# Patient Record
Sex: FEMALE | Race: BLACK OR AFRICAN AMERICAN | Employment: STUDENT | URBAN - METROPOLITAN AREA
[De-identification: names, ages, dates, MRNs, and addresses within clinical notes are randomized per-mention and may not be internally consistent; named-entity substitution may affect disease eponyms.]

---

## 2018-08-24 ENCOUNTER — OFFICE VISIT (OUTPATIENT)
Dept: FAMILY MEDICINE CLINIC | Age: 18
End: 2018-08-24

## 2018-08-24 VITALS
BODY MASS INDEX: 24.49 KG/M2 | OXYGEN SATURATION: 98 % | DIASTOLIC BLOOD PRESSURE: 62 MMHG | HEIGHT: 65 IN | SYSTOLIC BLOOD PRESSURE: 110 MMHG | HEART RATE: 79 BPM | WEIGHT: 147 LBS | TEMPERATURE: 98.4 F

## 2018-08-24 DIAGNOSIS — J00 ACUTE NASOPHARYNGITIS (COMMON COLD): Primary | ICD-10-CM

## 2018-08-24 PROCEDURE — 1036F TOBACCO NON-USER: CPT | Performed by: NURSE PRACTITIONER

## 2018-08-24 PROCEDURE — 99203 OFFICE O/P NEW LOW 30 MIN: CPT | Performed by: NURSE PRACTITIONER

## 2018-08-24 PROCEDURE — G8427 DOCREV CUR MEDS BY ELIG CLIN: HCPCS | Performed by: NURSE PRACTITIONER

## 2018-08-24 PROCEDURE — G8420 CALC BMI NORM PARAMETERS: HCPCS | Performed by: NURSE PRACTITIONER

## 2018-08-24 RX ORDER — PSEUDOEPHEDRINE HCL 120 MG/1
120 TABLET, FILM COATED, EXTENDED RELEASE ORAL EVERY 12 HOURS
Refills: 1 | COMMUNITY
Start: 2018-08-24 | End: 2018-08-31

## 2018-08-24 RX ORDER — DIPHENHYDRAMINE HCL 25 MG
CAPSULE ORAL
COMMUNITY
Start: 2018-08-24

## 2018-08-24 RX ORDER — AMOXICILLIN AND CLAVULANATE POTASSIUM 875; 125 MG/1; MG/1
1 TABLET, FILM COATED ORAL EVERY 12 HOURS
Qty: 10 TABLET | Refills: 0 | Status: SHIPPED | OUTPATIENT
Start: 2018-08-24 | End: 2018-08-29

## 2018-08-24 ASSESSMENT — ENCOUNTER SYMPTOMS
EYES NEGATIVE: 1
TROUBLE SWALLOWING: 0
SORE THROAT: 1
DIARRHEA: 0
WHEEZING: 0
SINUS PAIN: 0
CHEST TIGHTNESS: 0
VOMITING: 0
NAUSEA: 0
SINUS PRESSURE: 0
RHINORRHEA: 1
ABDOMINAL PAIN: 0
COUGH: 0

## 2018-08-24 ASSESSMENT — PATIENT HEALTH QUESTIONNAIRE - PHQ9
SUM OF ALL RESPONSES TO PHQ9 QUESTIONS 1 & 2: 0
SUM OF ALL RESPONSES TO PHQ QUESTIONS 1-9: 0
SUM OF ALL RESPONSES TO PHQ QUESTIONS 1-9: 0
2. FEELING DOWN, DEPRESSED OR HOPELESS: 0
1. LITTLE INTEREST OR PLEASURE IN DOING THINGS: 0

## 2018-08-24 NOTE — PATIENT INSTRUCTIONS
Colds last on avg 7-10 days (sometimes up to 2wks) with peak in symptoms on days 3-5. Recommend symptomatic treamtent with rest, fluids, good handwashing. The following may also be helpful for symptom management:    >Cold-eeze (zinc gluconate lozenges)-> dissolve 1 lozenge in mouth every 2-4 hrs while awake which can be helpful in supporting your immune system in fighting cold. >Sudafed 12 hr for nasal congestion. This is behind the pharmacy counter and will require that you show your license to purchase. >Benadryl 25-50mg at bedtime for nasal drainage (and may help with sleep). >Over the counter pain relievers acetaminophen and/or ibuprofen as needed and do not exceed daily amount printed on label. >Saltwater gargles, soft foods, cool liquids, over the counter cepacol throat lozenges as needed for sore throat. >Delsym as needed for cough. Follow directions on label. >1-2 tsp of honey before bedtime as needed for cough. **Antibiotics are NOT helpful and can be harmful when used to treat colds due to the possibility of unwanted side effects and the development of resistant bacteria. If you have symptoms that last for another 3 days without improvement then can fill augmentin (antibiotic) and take for full course. Please take a refrigerated probiotic (lactobacillus, bifidophilus) while on antibiotic and for several weeks to months following. Return for follow-up if you develop a new fever or severe/worsening symptoms at any point.

## 2018-08-24 NOTE — PROGRESS NOTES
Josue Rascon is a 19 y.o. female. Chief Complaint   Patient presents with    URI     Cough, Sore Throat, Congestion, Mucus, x 2 wks        SUBJECTIVE:     HPI     Yunior Downs is a new pt who presents with upper resp symptoms x 2 wks. Reports intermittent pharyngitis, nasal congestion+drainage, and mild cough (says she hasn't coughed in the last few days). Symptoms started with sore throat and then transitioned to head and nasal congestion. Nasal drainage has been clear, some sneezing. Feels nasal drainage in the back of her throat. Reports subjective fevers/chills the last 1-2 days, no objective fever. She has been taking otc cough/cold medicine without relief. Denies hx of allergic rhinitis. Non-smoker. Says she is on thyroid replacement? not sure of dose. She appears to have Hashimoto's or developing Hashimoto's based on her recent labs w/ [facility] (normal TSH and fT4 with significantly ELEVATED antibodies to TPO and thyroglobulin). Has been on zoloft for the last few years for anxiety and depression. Denies other sig past medical history. Review of Systems   Constitutional: Positive for chills, fatigue and fever (subjective). Negative for activity change, appetite change and diaphoresis. HENT: Positive for congestion, postnasal drip, rhinorrhea, sneezing and sore throat. Negative for ear pain, sinus pain, sinus pressure and trouble swallowing. Eyes: Negative. Respiratory: Negative for cough, chest tightness and wheezing. Cardiovascular: Negative. Gastrointestinal: Negative for abdominal pain, diarrhea, nausea and vomiting. Musculoskeletal: Negative for arthralgias and myalgias. Skin: Negative for rash. Neurological: Negative.         OBJECTIVE:      /62   Pulse 79   Temp 98.4 °F (36.9 °C) (Temporal)   Ht 5' 5\" (1.651 m)   Wt 147 lb (66.7 kg)   LMP 08/01/2018 (Approximate)   SpO2 98%   BMI 24.46 kg/m²       Physical Exam   Constitutional: She is oriented to person, place, and time. She appears well-developed and well-nourished. Non-toxic appearance. She does not have a sickly appearance. No distress. HENT:   Head: Normocephalic and atraumatic. Right Ear: Tympanic membrane, external ear and ear canal normal.   Left Ear: Tympanic membrane, external ear and ear canal normal.   Nose: Mucosal edema (left turbinates) present. No rhinorrhea. Right sinus exhibits no maxillary sinus tenderness and no frontal sinus tenderness. Left sinus exhibits no maxillary sinus tenderness and no frontal sinus tenderness. Mouth/Throat: Uvula is midline, oropharynx is clear and moist and mucous membranes are normal. No oropharyngeal exudate, posterior oropharyngeal edema or posterior oropharyngeal erythema. Eyes: Conjunctivae are normal. Right eye exhibits no discharge. Left eye exhibits no discharge. Neck: Normal range of motion. Neck supple. Cardiovascular: Normal rate, regular rhythm and normal heart sounds. Pulmonary/Chest: Effort normal and breath sounds normal. No respiratory distress. She has no wheezes. She has no rales. She exhibits no tenderness. Musculoskeletal: Normal range of motion. Lymphadenopathy:     She has no cervical adenopathy. Neurological: She is alert and oriented to person, place, and time. Skin: No rash noted. She is not diaphoretic. Psychiatric: She has a normal mood and affect. Her behavior is normal.       ASSESSMENT/PLAN:     Diagnosis Orders   1. Acute nasopharyngitis (common cold)  pseudoephedrine (SUDAFED 12 HR) 120 MG extended release tablet    diphenhydrAMINE (BENADRYL) 25 MG capsule   1. H&P c/w cold. Discussed symptomatic tx with rest, fluids, sudafed, benadryl, otc pain relievers, zinc gluconate.  Given persistent symptoms we discussed that if she has no improvement in 3 days after the above interventions then can fill augmentin and take full 5 day course ->at that point concern for secondary bacterial infx and may benefit from antibiotic. Discussed starting on refrigerated probiotic and yogurt if she starts taking antibiotic. Advised to follow-up if she develops severe/worsening symptoms or should f/u if no improvement in symptoms in 5 days. Pt voices understanding and is agreeable to plan. Current Outpatient Prescriptions   Medication Sig Dispense Refill    pseudoephedrine (SUDAFED 12 HR) 120 MG extended release tablet Take 1 tablet by mouth every 12 hours for 7 days  1    amoxicillin-clavulanate (AUGMENTIN) 875-125 MG per tablet Take 1 tablet by mouth every 12 hours for 5 days 10 tablet 0    diphenhydrAMINE (BENADRYL) 25 MG capsule 1-2 cap at bedtime as needed for runny nose. May cause sedation.  sertraline (ZOLOFT) 50 MG tablet Take 50 mg by mouth daily       No current facility-administered medications for this visit. No orders of the defined types were placed in this encounter. Return if symptoms worsen or fail to improve. Patient Instructions   Colds last on avg 7-10 days (sometimes up to 2wks) with peak in symptoms on days 3-5. Recommend symptomatic treamtent with rest, fluids, good handwashing. The following may also be helpful for symptom management:    >Cold-eeze (zinc gluconate lozenges)-> dissolve 1 lozenge in mouth every 2-4 hrs while awake which can be helpful in supporting your immune system in fighting cold. >Sudafed 12 hr for nasal congestion. This is behind the pharmacy counter and will require that you show your license to purchase. >Benadryl 25-50mg at bedtime for nasal drainage (and may help with sleep). >Over the counter pain relievers acetaminophen and/or ibuprofen as needed and do not exceed daily amount printed on label. >Saltwater gargles, soft foods, cool liquids, over the counter cepacol throat lozenges as needed for sore throat. >Delsym as needed for cough. Follow directions on label. >1-2 tsp of honey before bedtime as needed for cough.       **Antibiotics are NOT

## 2022-01-26 ENCOUNTER — TELEPHONE (OUTPATIENT)
Dept: GASTROENTEROLOGY | Age: 22
End: 2022-01-26

## 2022-01-26 NOTE — TELEPHONE ENCOUNTER
Called pt. In regards to an external referral for nausea, vomiting, diarrhea and abd pain. LM for pt to call back and make an appt.

## 2022-02-08 ENCOUNTER — TELEPHONE (OUTPATIENT)
Dept: GASTROENTEROLOGY | Age: 22
End: 2022-02-08

## 2022-02-08 NOTE — TELEPHONE ENCOUNTER
Called pt. In regards to an external referral for diarrhea, abd pain, nausea and vomiting.  Made appt for pt to see sandy on 2/16/22 @3:30pm

## 2022-02-16 ENCOUNTER — OFFICE VISIT (OUTPATIENT)
Dept: GASTROENTEROLOGY | Age: 22
End: 2022-02-16
Payer: COMMERCIAL

## 2022-02-16 VITALS
DIASTOLIC BLOOD PRESSURE: 62 MMHG | SYSTOLIC BLOOD PRESSURE: 116 MMHG | OXYGEN SATURATION: 97 % | HEART RATE: 73 BPM | HEIGHT: 65 IN | BODY MASS INDEX: 28.32 KG/M2 | WEIGHT: 170 LBS | TEMPERATURE: 98 F

## 2022-02-16 DIAGNOSIS — R11.15 CYCLICAL VOMITING WITH NAUSEA: Primary | ICD-10-CM

## 2022-02-16 PROCEDURE — 99203 OFFICE O/P NEW LOW 30 MIN: CPT | Performed by: NURSE PRACTITIONER

## 2022-02-16 ASSESSMENT — ENCOUNTER SYMPTOMS
ABDOMINAL PAIN: 0
COLOR CHANGE: 0
WHEEZING: 0
SHORTNESS OF BREATH: 0
CONSTIPATION: 0
VOMITING: 0
BACK PAIN: 0
COUGH: 0
DIARRHEA: 0
EYE PAIN: 0
BLOOD IN STOOL: 0
PHOTOPHOBIA: 0
NAUSEA: 0

## 2022-02-16 NOTE — PROGRESS NOTES
Cyril Johnson 19 y.o. female was seen by ZAHEER Grace on 2/16/2022     Wt Readings from Last 3 Encounters:   02/16/22 170 lb (77.1 kg)   08/24/18 147 lb (66.7 kg)       HPI  Cyril Johnson is a pleasant 19 y.o.  female who presents today for cyclical episodes of abdominal pain, diarrhea, nausea and vomiting. She has a past medical history of anxiety, depression, and hyperthyroidism. She denies new medication or diet changes. She denies NSAID use or smoking cigarettes. Occasional social alcohol use. She has been smoking marijuana for twelve years. Her cyclical episodes started in July of 2021 and mentioned at that time had a relationship that ended. She denied correlation with menstrual cycle. She did correlate the episodes with stressful events and anxiety. Her episodes of abdominal pain, diarrhea, nausea and vomiting that lasted 24 hours started in July then occurred again in September, October and December. No further episodes since after Christmas 2021. Her appetite is fair without early satiety. No lactose or gluten intolerance. Her weight is stable. No nausea or vomiting. No abdominal pain. Intermittent bloating. No heartburn or acid reflux. No nocturnal awakenings with acid reflux. No dysphagia or pain with swallowing. No changes in her bowel pattern. Her typical bowel pattern is every other day with soft brown formed stools. No current diarrhea or constipation. No blood in her stools or melena. No family history of stomach or colon cancer. ROS  Review of Systems   Constitutional: Positive for appetite change. Negative for chills, diaphoresis, fatigue, fever and unexpected weight change. HENT: Negative for ear pain, hearing loss and tinnitus. Eyes: Positive for visual disturbance. Negative for photophobia and pain. Respiratory: Negative for cough, shortness of breath and wheezing.     Cardiovascular: Negative for chest pain, palpitations and leg swelling. Gastrointestinal: Negative for abdominal pain, blood in stool, constipation, diarrhea, nausea and vomiting. Endocrine: Negative for cold intolerance, heat intolerance and polydipsia. Genitourinary: Negative for dysuria, frequency and urgency. Musculoskeletal: Negative for back pain, myalgias and neck pain. Skin: Negative for color change, pallor and rash. Allergic/Immunologic: Negative for environmental allergies and food allergies. Neurological: Negative for dizziness, seizures, weakness and headaches. Hematological: Bruises/bleeds easily. Psychiatric/Behavioral: Positive for dysphoric mood and sleep disturbance. Negative for suicidal ideas. The patient is nervous/anxious. Allergies  No Known Allergies    Medications  Current Outpatient Medications   Medication Sig Dispense Refill    sertraline (ZOLOFT) 50 MG tablet Take 50 mg by mouth daily      diphenhydrAMINE (BENADRYL) 25 MG capsule 1-2 cap at bedtime as needed for runny nose. May cause sedation. No current facility-administered medications for this visit. Past medical history:   She has a past medical history of Anxiety, Depression, and Hyperthyroidism. Past surgical history:  She has no past surgical history on file. Social History:  She reports that she quit smoking about 5 years ago. Her smoking use included cigarettes. She started smoking about 5 years ago. She has never used smokeless tobacco. She reports that she does not drink alcohol and does not use drugs. Family history:  Her family history is not on file. Objective    Vitals:    02/16/22 1601   BP: 116/62   Pulse: 73   Temp: 98 °F (36.7 °C)   SpO2: 97%        Physical exam    Physical Exam  Vitals reviewed. Constitutional:       General: She is not in acute distress. Appearance: Normal appearance. She is well-developed. She is not ill-appearing, toxic-appearing or diaphoretic. HENT:      Head: Normocephalic and atraumatic. Nose: Nose normal.      Mouth/Throat:      Mouth: Mucous membranes are moist.   Eyes:      General:         Right eye: No discharge. Left eye: No discharge. Pupils: Pupils are equal, round, and reactive to light. Neck:      Trachea: No tracheal deviation. Cardiovascular:      Rate and Rhythm: Normal rate and regular rhythm. Pulses: Normal pulses. Heart sounds: Normal heart sounds. No murmur heard. No gallop. Pulmonary:      Effort: Pulmonary effort is normal. No respiratory distress. Breath sounds: Normal breath sounds. No stridor. No wheezing, rhonchi or rales. Abdominal:      General: Bowel sounds are normal. There is no distension. Palpations: Abdomen is soft. There is no mass. Tenderness: There is no abdominal tenderness. There is no guarding or rebound. Hernia: No hernia is present. Musculoskeletal:         General: Normal range of motion. Cervical back: Normal range of motion and neck supple. Skin:     General: Skin is warm and dry. Neurological:      Mental Status: She is alert and oriented to person, place, and time. Psychiatric:         Mood and Affect: Mood normal.         No visits with results within 2 Month(s) from this visit. Latest known visit with results is:   No results found for any previous visit. Assessment and Plan:  1. Cyclical episodes of abdominal pain, diarrhea, nausea and vomiting that lasted 24 hours started in July then occurred again in September, October and December. No further episodes since after Christmas 2021. Her symptoms most likely related to anxiety or stressful situations. No current symptoms and reports feeling good. Her Zoloft has been adjusted by her PCP. Recommend stress reduction and avoiding situations that worsen stress and anxiety. Discussed marijuana may increase risk for cannibanoid hyperemesis and she verbalized understanding. No red flags indicated for EGD or colonoscopy at this time. Discussed abdominal ultrasound and EGD will hold off at this time due to resolution of symptoms. 2.  The patient was encouraged to follow-up as needed. Total time:  30 minutes.

## 2022-02-21 ENCOUNTER — TELEPHONE (OUTPATIENT)
Dept: GASTROENTEROLOGY | Age: 22
End: 2022-02-21

## 2022-02-21 NOTE — TELEPHONE ENCOUNTER
Mother of patient called in to report she and daughter are currently at urgent care.   Maegan Fontanez recommended that they do the urgent care appt and call back to make a follow up appt in office

## 2022-02-28 ENCOUNTER — OFFICE VISIT (OUTPATIENT)
Dept: GASTROENTEROLOGY | Age: 22
End: 2022-02-28
Payer: COMMERCIAL

## 2022-02-28 VITALS
HEART RATE: 87 BPM | OXYGEN SATURATION: 97 % | WEIGHT: 167.8 LBS | TEMPERATURE: 97.5 F | DIASTOLIC BLOOD PRESSURE: 76 MMHG | BODY MASS INDEX: 27.96 KG/M2 | HEIGHT: 65 IN | SYSTOLIC BLOOD PRESSURE: 118 MMHG

## 2022-02-28 DIAGNOSIS — F41.9 ANXIETY: ICD-10-CM

## 2022-02-28 DIAGNOSIS — R11.15 CYCLICAL VOMITING WITH NAUSEA: Primary | ICD-10-CM

## 2022-02-28 PROCEDURE — 99213 OFFICE O/P EST LOW 20 MIN: CPT | Performed by: NURSE PRACTITIONER

## 2022-02-28 RX ORDER — ONDANSETRON 4 MG/1
TABLET, ORALLY DISINTEGRATING ORAL
COMMUNITY
Start: 2022-02-21

## 2022-02-28 RX ORDER — HYDROXYZINE 50 MG/1
TABLET, FILM COATED ORAL
COMMUNITY
Start: 2022-02-21

## 2022-04-13 ENCOUNTER — TELEMEDICINE (OUTPATIENT)
Dept: PSYCHOLOGY | Age: 22
End: 2022-04-13
Payer: COMMERCIAL

## 2022-04-13 DIAGNOSIS — F41.9 ANXIETY: Primary | ICD-10-CM

## 2022-04-13 DIAGNOSIS — F32.A DEPRESSIVE DISORDER: ICD-10-CM

## 2022-04-13 PROCEDURE — 90791 PSYCH DIAGNOSTIC EVALUATION: CPT | Performed by: PSYCHOLOGIST
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
## 2022-06-08 ENCOUNTER — TELEMEDICINE (OUTPATIENT)
Dept: PSYCHOLOGY | Age: 22
End: 2022-06-08

## 2022-06-08 DIAGNOSIS — F41.9 ANXIETY: Primary | ICD-10-CM

## 2022-06-08 PROCEDURE — 99999 PR OFFICE/OUTPT VISIT,PROCEDURE ONLY: CPT | Performed by: PSYCHOLOGIST

## 2022-06-17 ENCOUNTER — HOSPITAL ENCOUNTER (OUTPATIENT)
Age: 22
Setting detail: SPECIMEN
Discharge: HOME | End: 2022-06-17
Payer: COMMERCIAL

## 2022-06-17 ENCOUNTER — OFFICE VISIT (OUTPATIENT)
Dept: OBGYN | Age: 22
End: 2022-06-17
Payer: COMMERCIAL

## 2022-06-17 VITALS — BODY MASS INDEX: 29.12 KG/M2 | DIASTOLIC BLOOD PRESSURE: 71 MMHG | WEIGHT: 175 LBS | SYSTOLIC BLOOD PRESSURE: 115 MMHG

## 2022-06-17 DIAGNOSIS — N94.6 DYSMENORRHEA: ICD-10-CM

## 2022-06-17 DIAGNOSIS — Z01.419 WOMEN'S ANNUAL ROUTINE GYNECOLOGICAL EXAMINATION: Primary | ICD-10-CM

## 2022-06-17 DIAGNOSIS — N89.8 VAGINAL DISCHARGE: ICD-10-CM

## 2022-06-17 DIAGNOSIS — Z30.09 GENERAL COUNSELING AND ADVICE ON CONTRACEPTIVE MANAGEMENT: ICD-10-CM

## 2022-06-17 DIAGNOSIS — N92.0 MENORRHAGIA WITH REGULAR CYCLE: ICD-10-CM

## 2022-06-17 DIAGNOSIS — Z11.3 SCREENING EXAMINATION FOR STD (SEXUALLY TRANSMITTED DISEASE): ICD-10-CM

## 2022-06-17 PROCEDURE — 88142 CYTOPATH C/V THIN LAYER: CPT

## 2022-06-17 PROCEDURE — 87801 DETECT AGNT MULT DNA AMPLI: CPT

## 2022-06-17 PROCEDURE — 99395 PREV VISIT EST AGE 18-39: CPT

## 2022-06-17 RX ORDER — DULOXETIN HYDROCHLORIDE 20 MG/1
20 CAPSULE, DELAYED RELEASE ORAL DAILY
COMMUNITY

## 2022-06-17 SDOH — ECONOMIC STABILITY: FOOD INSECURITY: WITHIN THE PAST 12 MONTHS, THE FOOD YOU BOUGHT JUST DIDN'T LAST AND YOU DIDN'T HAVE MONEY TO GET MORE.: NEVER TRUE

## 2022-06-17 SDOH — ECONOMIC STABILITY: FOOD INSECURITY: WITHIN THE PAST 12 MONTHS, YOU WORRIED THAT YOUR FOOD WOULD RUN OUT BEFORE YOU GOT MONEY TO BUY MORE.: NEVER TRUE

## 2022-06-17 SDOH — ECONOMIC STABILITY: INCOME INSECURITY: IN THE LAST 12 MONTHS, WAS THERE A TIME WHEN YOU WERE NOT ABLE TO PAY THE MORTGAGE OR RENT ON TIME?: NO

## 2022-06-17 SDOH — ECONOMIC STABILITY: TRANSPORTATION INSECURITY
IN THE PAST 12 MONTHS, HAS THE LACK OF TRANSPORTATION KEPT YOU FROM MEDICAL APPOINTMENTS OR FROM GETTING MEDICATIONS?: NO

## 2022-06-17 SDOH — ECONOMIC STABILITY: TRANSPORTATION INSECURITY
IN THE PAST 12 MONTHS, HAS LACK OF TRANSPORTATION KEPT YOU FROM MEETINGS, WORK, OR FROM GETTING THINGS NEEDED FOR DAILY LIVING?: NO

## 2022-06-17 SDOH — ECONOMIC STABILITY: HOUSING INSECURITY
IN THE LAST 12 MONTHS, WAS THERE A TIME WHEN YOU DID NOT HAVE A STEADY PLACE TO SLEEP OR SLEPT IN A SHELTER (INCLUDING NOW)?: NO

## 2022-06-17 ASSESSMENT — PATIENT HEALTH QUESTIONNAIRE - PHQ9
SUM OF ALL RESPONSES TO PHQ9 QUESTIONS 1 & 2: 0
7. TROUBLE CONCENTRATING ON THINGS, SUCH AS READING THE NEWSPAPER OR WATCHING TELEVISION: 0
SUM OF ALL RESPONSES TO PHQ QUESTIONS 1-9: 0
1. LITTLE INTEREST OR PLEASURE IN DOING THINGS: 0
9. THOUGHTS THAT YOU WOULD BE BETTER OFF DEAD, OR OF HURTING YOURSELF: 0
5. POOR APPETITE OR OVEREATING: 0
SUM OF ALL RESPONSES TO PHQ QUESTIONS 1-9: 0
4. FEELING TIRED OR HAVING LITTLE ENERGY: 0
SUM OF ALL RESPONSES TO PHQ QUESTIONS 1-9: 0
2. FEELING DOWN, DEPRESSED OR HOPELESS: 0
10. IF YOU CHECKED OFF ANY PROBLEMS, HOW DIFFICULT HAVE THESE PROBLEMS MADE IT FOR YOU TO DO YOUR WORK, TAKE CARE OF THINGS AT HOME, OR GET ALONG WITH OTHER PEOPLE: 0
SUM OF ALL RESPONSES TO PHQ QUESTIONS 1-9: 0
6. FEELING BAD ABOUT YOURSELF - OR THAT YOU ARE A FAILURE OR HAVE LET YOURSELF OR YOUR FAMILY DOWN: 0
3. TROUBLE FALLING OR STAYING ASLEEP: 0
8. MOVING OR SPEAKING SO SLOWLY THAT OTHER PEOPLE COULD HAVE NOTICED. OR THE OPPOSITE, BEING SO FIGETY OR RESTLESS THAT YOU HAVE BEEN MOVING AROUND A LOT MORE THAN USUAL: 0

## 2022-06-17 ASSESSMENT — ENCOUNTER SYMPTOMS
RESPIRATORY NEGATIVE: 1
GASTROINTESTINAL NEGATIVE: 1
ABDOMINAL PAIN: 0

## 2022-06-17 ASSESSMENT — SOCIAL DETERMINANTS OF HEALTH (SDOH): HOW HARD IS IT FOR YOU TO PAY FOR THE VERY BASICS LIKE FOOD, HOUSING, MEDICAL CARE, AND HEATING?: NOT HARD AT ALL

## 2022-06-17 NOTE — PROGRESS NOTES
22    Karlos Scott  2000    Chief Complaint   Patient presents with    Annual Exam     LMP 22, not sexually active, no BC and wants to discuss options today, no pap reported, c/o of menorhhagie with clots and dysmennorrhea.  Other     STD testing        The patient is a 23 y.o. female, No obstetric history on file. who presents for her annual exam.  She is menstruating abnormally. She is  sexually active. She is not currently taking birth control    She reports additional symptoms of STD exposure, vaginal discharge/irritation and menorrhagia/dysmenorrhea. Pap smear history: She has not had a PAP smear in the past.    Past Medical History:   Diagnosis Date    Anxiety     Depression     Dysmenorrhea     Hyperthyroidism     Menorrhagia        History reviewed. No pertinent surgical history. History reviewed. No pertinent family history. Social History     Tobacco Use    Smoking status: Former Smoker     Types: Cigarettes     Start date: 2016     Quit date: 2017     Years since quittin.3    Smokeless tobacco: Never Used   Vaping Use    Vaping Use: Never used   Substance Use Topics    Alcohol use: Yes     Comment: socially    Drug use: Yes     Types: Marijuana Lum Olden)       Current Outpatient Medications   Medication Sig Dispense Refill    DULoxetine (CYMBALTA) 20 MG extended release capsule Take 20 mg by mouth daily      ondansetron (ZOFRAN-ODT) 4 MG disintegrating tablet TAKE 1 TABLET (ORAL) EVERY 8 HOURS AS NEEDED FOR 3 DAYS DO NOT EXCEED 3 PILLS IN 24 HOURS.  hydrOXYzine (ATARAX) 50 MG tablet TAKE 1 TABLET (ORAL) 2 TIMES PER DAY AS NEEDED FOR 10 DAYS (Patient not taking: Reported on 2022)      sertraline (ZOLOFT) 50 MG tablet Take 50 mg by mouth daily (Patient not taking: Reported on 2022)      diphenhydrAMINE (BENADRYL) 25 MG capsule 1-2 cap at bedtime as needed for runny nose. May cause sedation.  (Patient not taking: Reported on 2022) No current facility-administered medications for this visit. No Known Allergies    No obstetric history on file. Immunization History   Administered Date(s) Administered    COVID-19, Pfizer Purple top, DILUTE for use, 12+ yrs, 30mcg/0.3mL dose 02/06/2021, 02/27/2021, 03/09/2022       Review of Systems   Constitutional: Negative. Negative for fatigue and fever. Respiratory: Negative. Gastrointestinal: Negative. Negative for abdominal pain. Endocrine: Negative. Genitourinary: Positive for menstrual problem and vaginal discharge. Negative for dysuria, frequency, pelvic pain, vaginal bleeding and vaginal pain. Musculoskeletal: Negative. Skin: Negative. Neurological: Negative. Negative for dizziness and headaches. Psychiatric/Behavioral: Negative. /71   Wt 175 lb (79.4 kg)   LMP 05/20/2022 (Exact Date)   BMI 29.12 kg/m²     Physical Exam  Vitals and nursing note reviewed. Constitutional:       General: She is not in acute distress. Appearance: Normal appearance. She is normal weight. HENT:      Head: Normocephalic and atraumatic. Pulmonary:      Effort: Pulmonary effort is normal. No respiratory distress. Chest:   Breasts:      Right: Normal. No axillary adenopathy or supraclavicular adenopathy. Left: Normal. No axillary adenopathy or supraclavicular adenopathy. Abdominal:      Palpations: Abdomen is soft. Tenderness: There is no abdominal tenderness. Genitourinary:     General: Normal vulva. Exam position: Lithotomy position. Vagina: Vaginal discharge (thin white) present. Cervix: Normal.      Uterus: Normal.       Adnexa: Right adnexa normal and left adnexa normal.   Musculoskeletal:         General: Normal range of motion. Cervical back: Normal range of motion. Lymphadenopathy:      Upper Body:      Right upper body: No supraclavicular or axillary adenopathy.       Left upper body: No supraclavicular or axillary adenopathy. Skin:     General: Skin is warm and dry. Findings: No rash. Neurological:      General: No focal deficit present. Mental Status: She is alert and oriented to person, place, and time. Psychiatric:         Mood and Affect: Mood normal.         Behavior: Behavior normal.         Thought Content: Thought content normal.         No results found for this visit on 06/17/22. Assessment and Plan   Diagnosis Orders   1. Women's annual routine gynecological examination  PAP SMEAR   2. General counseling and advice on contraceptive management     3. Dysmenorrhea     4. Menorrhagia with regular cycle     5. Screening examination for STD (sexually transmitted disease)  Vaginal Pathogens Probes *A    C.trachomatis N.gonorrhoeae DNA, Thin Prep   6. Vaginal discharge  Vaginal Pathogens Probes *A    C.trachomatis N.gonorrhoeae DNA, Thin Prep     Pap, g/c pap, bv panel. Pt declines serum STD screening today    Various contraceptive methods reviewed with patient, including: OCPs, IUDs, Nexplanon, Depo injection, NuvaRing, patch. Commonly reported complaints/side effect profiles with each reviewed in detail, as well as advantages of each option. Pt is interested in St Ulric insertion at this time. Patient encouraged to call or return if unwanted side effects or any other issues arise before scheduled follow-up date. Return for liletta insertion.     Lauri Munoz PA-C

## 2022-06-18 LAB
CANDIDA SPECIES, DNA PROBE: ABNORMAL
GARDNERELLA VAGINALIS, DNA PROBE: ABNORMAL
TRICHOMONAS VAGINALIS DNA: ABNORMAL

## 2022-06-20 DIAGNOSIS — N76.0 BACTERIAL VAGINOSIS: Primary | ICD-10-CM

## 2022-06-20 DIAGNOSIS — B96.89 BACTERIAL VAGINOSIS: Primary | ICD-10-CM

## 2022-06-20 RX ORDER — METRONIDAZOLE 500 MG/1
500 TABLET ORAL 2 TIMES DAILY
Qty: 14 TABLET | Refills: 0 | Status: SHIPPED | OUTPATIENT
Start: 2022-06-20 | End: 2022-06-27

## 2022-06-22 LAB
CHLAMYDIA BY THIN PREP: NEGATIVE
GC BY THIN PREP: NEGATIVE

## 2022-07-06 ENCOUNTER — PROCEDURE VISIT (OUTPATIENT)
Dept: OBGYN | Age: 22
End: 2022-07-06
Payer: COMMERCIAL

## 2022-07-06 VITALS
WEIGHT: 181.4 LBS | BODY MASS INDEX: 30.22 KG/M2 | DIASTOLIC BLOOD PRESSURE: 64 MMHG | HEIGHT: 65 IN | SYSTOLIC BLOOD PRESSURE: 115 MMHG

## 2022-07-06 DIAGNOSIS — N92.0 MENORRHAGIA WITH REGULAR CYCLE: ICD-10-CM

## 2022-07-06 DIAGNOSIS — Z30.430 ENCOUNTER FOR INSERTION OF INTRAUTERINE CONTRACEPTIVE DEVICE: Primary | ICD-10-CM

## 2022-07-06 LAB
CONTROL: NORMAL
PREGNANCY TEST URINE, POC: NEGATIVE

## 2022-07-06 PROCEDURE — 81025 URINE PREGNANCY TEST: CPT | Performed by: OBSTETRICS & GYNECOLOGY

## 2022-07-06 PROCEDURE — 58300 INSERT INTRAUTERINE DEVICE: CPT | Performed by: OBSTETRICS & GYNECOLOGY

## 2022-07-06 NOTE — PROGRESS NOTES
IUD insertion    Consent was signed Doreen Eric was chosen. Cervix was grasped with single-tooth tenaculum. Insertion was without complications. Sounding length was 8 cm. Strings were cut to 2 cm.   The patient will follow up in 1 month for an ultrasound

## 2022-07-14 RX ORDER — FLUCONAZOLE 150 MG/1
150 TABLET ORAL
Qty: 2 TABLET | Refills: 0 | Status: CANCELLED | OUTPATIENT
Start: 2022-07-14 | End: 2022-07-14

## 2022-07-14 NOTE — TELEPHONE ENCOUNTER
Pt had IUD placed 7/6/22. Pt states she now has a yeast infection. Itching and discharge. Pt would like to know if diflucan can be called in for her.

## 2022-07-15 RX ORDER — FLUCONAZOLE 150 MG/1
150 TABLET ORAL
Qty: 2 TABLET | Refills: 0 | Status: SHIPPED | OUTPATIENT
Start: 2022-07-15 | End: 2022-07-18

## 2022-08-03 ENCOUNTER — OFFICE VISIT (OUTPATIENT)
Dept: OBGYN | Age: 22
End: 2022-08-03
Payer: COMMERCIAL

## 2022-08-03 VITALS
WEIGHT: 181 LBS | DIASTOLIC BLOOD PRESSURE: 74 MMHG | BODY MASS INDEX: 30.16 KG/M2 | SYSTOLIC BLOOD PRESSURE: 116 MMHG | HEIGHT: 65 IN

## 2022-08-03 DIAGNOSIS — T83.31XA MECHANICAL BREAKDOWN OF INTRAUTERINE CONTRACEPTIVE DEVICE, INITIAL ENCOUNTER: Primary | ICD-10-CM

## 2022-08-03 PROCEDURE — 99214 OFFICE O/P EST MOD 30 MIN: CPT | Performed by: OBSTETRICS & GYNECOLOGY

## 2022-08-03 NOTE — PROGRESS NOTES
8/3/22    Latrice Corrales  2000    Chief Complaint   Patient presents with    Other     Ultrasound today, IUD in cervix         Latrice Corrales is a 23 y.o. female who presents today for evaluation of abnormal ultrasound. IUD in the lower uterine segment/cervix    Past Medical History:   Diagnosis Date    Anxiety     Depression     Dysmenorrhea     Hyperthyroidism     Menorrhagia        No past surgical history on file. Social History     Tobacco Use    Smoking status: Former     Types: Cigarettes     Start date: 2016     Quit date: 2017     Years since quittin.5    Smokeless tobacco: Never   Vaping Use    Vaping Use: Never used   Substance Use Topics    Alcohol use: Yes     Comment: socially    Drug use: Yes     Types: Marijuana (Chyrl Clarisa)       No family history on file. Current Outpatient Medications   Medication Sig Dispense Refill    DULoxetine (CYMBALTA) 20 MG extended release capsule Take 20 mg by mouth daily      hydrOXYzine (ATARAX) 50 MG tablet TAKE 1 TABLET (ORAL) 2 TIMES PER DAY AS NEEDED FOR 10 DAYS (Patient not taking: Reported on 2022)      ondansetron (ZOFRAN-ODT) 4 MG disintegrating tablet TAKE 1 TABLET (ORAL) EVERY 8 HOURS AS NEEDED FOR 3 DAYS DO NOT EXCEED 3 PILLS IN 24 HOURS.      sertraline (ZOLOFT) 50 MG tablet Take 50 mg by mouth daily (Patient not taking: Reported on 2022)      diphenhydrAMINE (BENADRYL) 25 MG capsule 1-2 cap at bedtime as needed for runny nose. May cause sedation. (Patient not taking: Reported on 2022)       No current facility-administered medications for this visit. No Known Allergies    No obstetric history on file.     Immunization History   Administered Date(s) Administered    COVID-19, PFIZER PURPLE top, DILUTE for use, (age 15 y+), 30mcg/0.3mL 2021, 2021, 2022       Review of Systems  All other systems reviewed and are negative    /74   Ht 5' 5\" (1.651 m)   Wt 181 lb (82.1 kg)   BMI 30.12 kg/m² Physical Exam    No results found for this visit on 08/03/22. ASSESSMENT AND PLAN   Diagnosis Orders   1. Mechanical breakdown of intrauterine contraceptive device, initial encounter        Today the IUD was grasped with a uterine packing forcep and replaced into the uterine cavity without complications. Repeat ultrasound confirmed that the IUD was within the uterine cavity. Return in about 3 months (around 11/3/2022) for ultrasound, follow up appointment.     Anita Schmitt MD

## 2022-08-22 RX ORDER — FLUCONAZOLE 150 MG/1
150 TABLET ORAL ONCE
Qty: 1 TABLET | Refills: 0 | Status: SHIPPED | OUTPATIENT
Start: 2022-08-22 | End: 2022-08-22

## 2022-08-24 ENCOUNTER — OFFICE VISIT (OUTPATIENT)
Dept: OBGYN | Age: 22
End: 2022-08-24
Payer: COMMERCIAL

## 2022-08-24 VITALS
DIASTOLIC BLOOD PRESSURE: 72 MMHG | WEIGHT: 183 LBS | BODY MASS INDEX: 30.49 KG/M2 | SYSTOLIC BLOOD PRESSURE: 116 MMHG | HEIGHT: 65 IN

## 2022-08-24 DIAGNOSIS — T83.32XA DISPLACEMENT OF INTRAUTERINE CONTRACEPTIVE DEVICE, INITIAL ENCOUNTER: Primary | ICD-10-CM

## 2022-08-24 PROCEDURE — 99213 OFFICE O/P EST LOW 20 MIN: CPT | Performed by: OBSTETRICS & GYNECOLOGY

## 2022-08-24 RX ORDER — LEVONORGESTREL 52 MG/1
1 INTRAUTERINE DEVICE INTRAUTERINE ONCE
COMMUNITY

## 2022-09-07 NOTE — PROGRESS NOTES
22    Jessie Persaud  2000    Chief Complaint   Patient presents with    Other     Pt states partner can feel IUD during intercourse. Jessie Persaud is a 23 y.o. female who presents today for evaluation of problems with her IUD. Past Medical History:   Diagnosis Date    Anxiety     Depression     Dysmenorrhea     Hyperthyroidism     Menorrhagia        No past surgical history on file. Social History     Tobacco Use    Smoking status: Former     Types: Cigarettes     Start date: 2016     Quit date: 2017     Years since quittin.6    Smokeless tobacco: Never   Vaping Use    Vaping Use: Never used   Substance Use Topics    Alcohol use: Yes     Comment: socially    Drug use: Yes     Types: Marijuana (Carliss Stair)       No family history on file. Current Outpatient Medications   Medication Sig Dispense Refill    levonorgestrel (LILETTA, 52 MG,) 20.1 MCG/DAY IUD IUD 52 mg 1 each by IntraUTERine route once      DULoxetine (CYMBALTA) 20 MG extended release capsule Take 20 mg by mouth daily      diphenhydrAMINE (BENADRYL) 25 MG capsule 1-2 cap at bedtime as needed for runny nose. May cause sedation. hydrOXYzine (ATARAX) 50 MG tablet TAKE 1 TABLET (ORAL) 2 TIMES PER DAY AS NEEDED FOR 10 DAYS (Patient not taking: No sig reported)      ondansetron (ZOFRAN-ODT) 4 MG disintegrating tablet TAKE 1 TABLET (ORAL) EVERY 8 HOURS AS NEEDED FOR 3 DAYS DO NOT EXCEED 3 PILLS IN 24 HOURS. (Patient not taking: Reported on 2022)      sertraline (ZOLOFT) 50 MG tablet Take 50 mg by mouth daily (Patient not taking: No sig reported)       No current facility-administered medications for this visit. No Known Allergies    No obstetric history on file.     Immunization History   Administered Date(s) Administered    COVID-19, PFIZER PURPLE top, DILUTE for use, (age 15 y+), 30mcg/0.3mL 2021, 2021, 2022       Review of Systems  All other systems reviewed and are negative    /72 Ht 5' 5\" (1.651 m)   Wt 183 lb (83 kg)   BMI 30.45 kg/m²     Physical Exam  IUD strings trimmed. No results found for this visit on 08/24/22. ASSESSMENT AND PLAN   Diagnosis Orders   1. Displacement of intrauterine contraceptive device, initial encounter          Will call to return if she has continued symptoms. Return if symptoms worsen or fail to improve.     Garland Freed MD

## 2023-05-25 ENCOUNTER — OFFICE VISIT (OUTPATIENT)
Dept: OBGYN | Age: 23
End: 2023-05-25
Payer: COMMERCIAL

## 2023-05-25 VITALS
DIASTOLIC BLOOD PRESSURE: 77 MMHG | WEIGHT: 202 LBS | HEIGHT: 65 IN | BODY MASS INDEX: 33.66 KG/M2 | SYSTOLIC BLOOD PRESSURE: 117 MMHG

## 2023-05-25 DIAGNOSIS — N89.8 VAGINAL DISCHARGE: Primary | ICD-10-CM

## 2023-05-25 PROCEDURE — 99213 OFFICE O/P EST LOW 20 MIN: CPT

## 2023-05-25 RX ORDER — FLUCONAZOLE 150 MG/1
300 TABLET ORAL SEE ADMIN INSTRUCTIONS
Qty: 2 TABLET | Refills: 0 | Status: SHIPPED | OUTPATIENT
Start: 2023-05-25

## 2023-05-25 ASSESSMENT — PATIENT HEALTH QUESTIONNAIRE - PHQ9
2. FEELING DOWN, DEPRESSED OR HOPELESS: 0
1. LITTLE INTEREST OR PLEASURE IN DOING THINGS: 0
5. POOR APPETITE OR OVEREATING: 0
4. FEELING TIRED OR HAVING LITTLE ENERGY: 0
SUM OF ALL RESPONSES TO PHQ9 QUESTIONS 1 & 2: 0
10. IF YOU CHECKED OFF ANY PROBLEMS, HOW DIFFICULT HAVE THESE PROBLEMS MADE IT FOR YOU TO DO YOUR WORK, TAKE CARE OF THINGS AT HOME, OR GET ALONG WITH OTHER PEOPLE: 0
6. FEELING BAD ABOUT YOURSELF - OR THAT YOU ARE A FAILURE OR HAVE LET YOURSELF OR YOUR FAMILY DOWN: 0
3. TROUBLE FALLING OR STAYING ASLEEP: 0
SUM OF ALL RESPONSES TO PHQ QUESTIONS 1-9: 0
8. MOVING OR SPEAKING SO SLOWLY THAT OTHER PEOPLE COULD HAVE NOTICED. OR THE OPPOSITE, BEING SO FIGETY OR RESTLESS THAT YOU HAVE BEEN MOVING AROUND A LOT MORE THAN USUAL: 0
9. THOUGHTS THAT YOU WOULD BE BETTER OFF DEAD, OR OF HURTING YOURSELF: 0
SUM OF ALL RESPONSES TO PHQ QUESTIONS 1-9: 0
SUM OF ALL RESPONSES TO PHQ QUESTIONS 1-9: 0
7. TROUBLE CONCENTRATING ON THINGS, SUCH AS READING THE NEWSPAPER OR WATCHING TELEVISION: 0
SUM OF ALL RESPONSES TO PHQ QUESTIONS 1-9: 0

## 2023-05-25 ASSESSMENT — ENCOUNTER SYMPTOMS
GASTROINTESTINAL NEGATIVE: 1
RESPIRATORY NEGATIVE: 1
ABDOMINAL PAIN: 0

## 2023-05-25 NOTE — PROGRESS NOTES
23    Lucita Carreon  2000    Chief Complaint   Patient presents with    Vaginal Discharge     Pt c/o white yellow clumpy vaginal discharge x 3wks with irritation. Denies itching and odor. Lucita Carreon is a 20 y.o. female who presents today for evaluation of vaginal discharg x 3 weeks. She denies urinary symptoms, abnormal odor. She reports slight irritation, states discharge is white/light yellow and thick. She states current symptoms remind her of past yeast infections, but more \"intense\". Past Medical History:   Diagnosis Date    Anxiety     Depression     Dysmenorrhea     Hyperthyroidism     Menorrhagia        No past surgical history on file. Social History     Tobacco Use    Smoking status: Former     Types: Cigarettes     Start date: 2016     Quit date: 2017     Years since quittin.3    Smokeless tobacco: Never   Vaping Use    Vaping Use: Never used   Substance Use Topics    Alcohol use: Yes     Comment: socially    Drug use: Yes     Types: Marijuana (Riley Babb)       No family history on file. Current Outpatient Medications   Medication Sig Dispense Refill    fluconazole (DIFLUCAN) 150 MG tablet Take 2 tablets by mouth See Admin Instructions 72 hrs apart 2 tablet 0    levonorgestrel (LILETTA, 52 MG,) 20.1 MCG/DAY IUD IUD 52 mg 1 each by IntraUTERine route once      DULoxetine (CYMBALTA) 20 MG extended release capsule Take 20 mg by mouth daily      hydrOXYzine (ATARAX) 50 MG tablet TAKE 1 TABLET (ORAL) 2 TIMES PER DAY AS NEEDED FOR 10 DAYS (Patient not taking: No sig reported)      ondansetron (ZOFRAN-ODT) 4 MG disintegrating tablet TAKE 1 TABLET (ORAL) EVERY 8 HOURS AS NEEDED FOR 3 DAYS DO NOT EXCEED 3 PILLS IN 24 HOURS. (Patient not taking: Reported on 2022)      sertraline (ZOLOFT) 50 MG tablet Take 50 mg by mouth daily (Patient not taking: No sig reported)      diphenhydrAMINE (BENADRYL) 25 MG capsule 1-2 cap at bedtime as needed for runny nose.  May cause

## 2023-05-26 LAB
C TRACH DNA CVX QL NAA+PROBE: NEGATIVE
CANDIDA DNA VAG QL NAA+PROBE: ABNORMAL
G VAGINALIS DNA SPEC QL NAA+PROBE: ABNORMAL
N GONORRHOEA DNA CERV MUCUS QL NAA+PROBE: NEGATIVE
T VAGINALIS DNA VAG QL NAA+PROBE: ABNORMAL

## 2023-07-24 ENCOUNTER — OFFICE VISIT (OUTPATIENT)
Dept: OBGYN | Age: 23
End: 2023-07-24
Payer: COMMERCIAL

## 2023-07-24 VITALS
HEART RATE: 79 BPM | BODY MASS INDEX: 33.82 KG/M2 | WEIGHT: 203 LBS | DIASTOLIC BLOOD PRESSURE: 79 MMHG | HEIGHT: 65 IN | SYSTOLIC BLOOD PRESSURE: 122 MMHG

## 2023-07-24 DIAGNOSIS — Z01.419 WOMEN'S ANNUAL ROUTINE GYNECOLOGICAL EXAMINATION: Primary | ICD-10-CM

## 2023-07-24 DIAGNOSIS — R11.0 NAUSEA: ICD-10-CM

## 2023-07-24 DIAGNOSIS — Z97.5 IUD (INTRAUTERINE DEVICE) IN PLACE: ICD-10-CM

## 2023-07-24 DIAGNOSIS — N89.8 VAGINAL IRRITATION: ICD-10-CM

## 2023-07-24 PROCEDURE — 99395 PREV VISIT EST AGE 18-39: CPT

## 2023-07-24 RX ORDER — HYDROXYZINE PAMOATE 25 MG/1
25 CAPSULE ORAL 3 TIMES DAILY PRN
COMMUNITY

## 2023-07-24 RX ORDER — ONDANSETRON 4 MG/1
4 TABLET, FILM COATED ORAL 3 TIMES DAILY PRN
Qty: 60 TABLET | Refills: 2 | Status: SHIPPED | OUTPATIENT
Start: 2023-07-24

## 2023-07-24 SDOH — ECONOMIC STABILITY: INCOME INSECURITY: HOW HARD IS IT FOR YOU TO PAY FOR THE VERY BASICS LIKE FOOD, HOUSING, MEDICAL CARE, AND HEATING?: NOT HARD AT ALL

## 2023-07-24 SDOH — ECONOMIC STABILITY: FOOD INSECURITY: WITHIN THE PAST 12 MONTHS, YOU WORRIED THAT YOUR FOOD WOULD RUN OUT BEFORE YOU GOT MONEY TO BUY MORE.: NEVER TRUE

## 2023-07-24 SDOH — ECONOMIC STABILITY: FOOD INSECURITY: WITHIN THE PAST 12 MONTHS, THE FOOD YOU BOUGHT JUST DIDN'T LAST AND YOU DIDN'T HAVE MONEY TO GET MORE.: NEVER TRUE

## 2023-07-24 ASSESSMENT — ENCOUNTER SYMPTOMS
ABDOMINAL PAIN: 0
NAUSEA: 1
RESPIRATORY NEGATIVE: 1

## 2023-07-26 DIAGNOSIS — B37.9 YEAST INFECTION: Primary | ICD-10-CM

## 2023-07-26 LAB
CANDIDA DNA VAG QL NAA+PROBE: ABNORMAL
G VAGINALIS DNA SPEC QL NAA+PROBE: ABNORMAL
T VAGINALIS DNA VAG QL NAA+PROBE: ABNORMAL

## 2023-07-26 RX ORDER — FLUCONAZOLE 150 MG/1
150 TABLET ORAL
Qty: 2 TABLET | Refills: 0 | Status: SHIPPED | OUTPATIENT
Start: 2023-07-26

## 2024-11-18 ENCOUNTER — OFFICE VISIT (OUTPATIENT)
Dept: OBGYN | Age: 24
End: 2024-11-18
Payer: COMMERCIAL

## 2024-11-18 VITALS
BODY MASS INDEX: 39.32 KG/M2 | DIASTOLIC BLOOD PRESSURE: 73 MMHG | WEIGHT: 236 LBS | HEART RATE: 76 BPM | HEIGHT: 65 IN | SYSTOLIC BLOOD PRESSURE: 121 MMHG

## 2024-11-18 DIAGNOSIS — N89.8 VAGINAL DISCHARGE: ICD-10-CM

## 2024-11-18 DIAGNOSIS — Z01.419 ENCOUNTER FOR ANNUAL ROUTINE GYNECOLOGICAL EXAMINATION: Primary | ICD-10-CM

## 2024-11-18 DIAGNOSIS — Z97.5 IUD (INTRAUTERINE DEVICE) IN PLACE: ICD-10-CM

## 2024-11-18 PROCEDURE — 99395 PREV VISIT EST AGE 18-39: CPT

## 2024-11-18 PROCEDURE — 99459 PELVIC EXAMINATION: CPT

## 2024-11-18 PROCEDURE — G8484 FLU IMMUNIZE NO ADMIN: HCPCS

## 2024-11-18 SDOH — ECONOMIC STABILITY: INCOME INSECURITY: HOW HARD IS IT FOR YOU TO PAY FOR THE VERY BASICS LIKE FOOD, HOUSING, MEDICAL CARE, AND HEATING?: NOT HARD AT ALL

## 2024-11-18 SDOH — ECONOMIC STABILITY: FOOD INSECURITY: WITHIN THE PAST 12 MONTHS, THE FOOD YOU BOUGHT JUST DIDN'T LAST AND YOU DIDN'T HAVE MONEY TO GET MORE.: NEVER TRUE

## 2024-11-18 SDOH — ECONOMIC STABILITY: FOOD INSECURITY: WITHIN THE PAST 12 MONTHS, YOU WORRIED THAT YOUR FOOD WOULD RUN OUT BEFORE YOU GOT MONEY TO BUY MORE.: NEVER TRUE

## 2024-11-18 ASSESSMENT — PATIENT HEALTH QUESTIONNAIRE - PHQ9
SUM OF ALL RESPONSES TO PHQ QUESTIONS 1-9: 0
SUM OF ALL RESPONSES TO PHQ QUESTIONS 1-9: 0
SUM OF ALL RESPONSES TO PHQ9 QUESTIONS 1 & 2: 0
SUM OF ALL RESPONSES TO PHQ QUESTIONS 1-9: 0
1. LITTLE INTEREST OR PLEASURE IN DOING THINGS: NOT AT ALL
2. FEELING DOWN, DEPRESSED OR HOPELESS: NOT AT ALL
SUM OF ALL RESPONSES TO PHQ QUESTIONS 1-9: 0

## 2024-11-18 ASSESSMENT — ENCOUNTER SYMPTOMS
NAUSEA: 0
ABDOMINAL PAIN: 0
SHORTNESS OF BREATH: 0
DIARRHEA: 0
RESPIRATORY NEGATIVE: 1
CHEST TIGHTNESS: 0
CONSTIPATION: 0
VOMITING: 0
GASTROINTESTINAL NEGATIVE: 1

## 2024-11-18 NOTE — PROGRESS NOTES
24    Wilma Delarosa  2000    Chief Complaint   Patient presents with    Gynecologic Exam     Patient is here for annual exam today. No LMP   no menses d/t IUD.  (Menstrual status: IUD)..Patient is sexually active. Patient is  currently on birth control liletta. Last pap was on Date of last Cervical Cancer screen (HPV or PAP): 2022  result were negative HPV not ordered. . Patient complains of vaginal discharge     Vaginal Discharge     Reports vaginal discharge. Patient describes the discharge as white, yellow, thick, and malodorous. Symptoms present for x1wk .  patient recently dx with Diabetes II and asking for recommendations to prevent yeast/BV infections         The patient is a 24 y.o. female, No obstetric history on file. who presents for her annual exam.  She is amenorrheic due to IUD.  She is  sexually active. She is currently taking birth control.  Birth control method is Liletta IUD.    She reports recurrent vaginal symptoms as above.    Pap smear history: Her last PAP smear was in .  Her results were normal.    Past Medical History:   Diagnosis Date    Anxiety     Depression     Diabetes mellitus (HCC)     Dysmenorrhea     Hyperthyroidism     Menorrhagia     Obesity     Vaginal discharge        No past surgical history on file.    No family history on file.    Social History     Tobacco Use    Smoking status: Former     Current packs/day: 0.00     Types: Cigarettes     Start date: 2016     Quit date: 2017     Years since quittin.8    Smokeless tobacco: Never   Vaping Use    Vaping status: Never Used   Substance Use Topics    Alcohol use: Yes     Comment: socially    Drug use: Not Currently     Types: Marijuana (Weed)       Current Outpatient Medications   Medication Sig Dispense Refill    ondansetron (ZOFRAN) 4 MG tablet Take 1 tablet by mouth 3 times daily as needed for Nausea or Vomiting 60 tablet 2    levonorgestrel (LILETTA, 52 MG,) 20.1 MCG/DAY IUD IUD 52 mg 1 each

## 2024-11-20 DIAGNOSIS — B37.9 YEAST INFECTION: ICD-10-CM

## 2024-11-20 LAB
BV BACTERIA DNA VAG QL NAA+PROBE: NOT DETECTED
C GLABRATA DNA VAG QL NAA+PROBE: ABNORMAL
C GLABRATA DNA VAG QL NAA+PROBE: NOT DETECTED
C KRUSEI DNA VAG QL NAA+PROBE: NOT DETECTED
CANDIDA DNA VAG QL NAA+PROBE: DETECTED
T VAGINALIS DNA VAG QL NAA+PROBE: NOT DETECTED

## 2024-11-20 RX ORDER — FLUCONAZOLE 150 MG/1
TABLET ORAL
Qty: 2 TABLET | Refills: 0 | Status: SHIPPED | OUTPATIENT
Start: 2024-11-20

## 2024-11-22 ENCOUNTER — PATIENT MESSAGE (OUTPATIENT)
Dept: OBGYN | Age: 24
End: 2024-11-22

## 2025-08-28 ENCOUNTER — OFFICE VISIT (OUTPATIENT)
Dept: OBGYN | Age: 25
End: 2025-08-28

## 2025-08-28 VITALS
HEART RATE: 81 BPM | DIASTOLIC BLOOD PRESSURE: 80 MMHG | WEIGHT: 210.2 LBS | SYSTOLIC BLOOD PRESSURE: 121 MMHG | BODY MASS INDEX: 35.02 KG/M2 | HEIGHT: 65 IN

## 2025-08-28 DIAGNOSIS — Z97.5 BREAKTHROUGH BLEEDING WITH IUD: Primary | ICD-10-CM

## 2025-08-28 DIAGNOSIS — N92.1 BREAKTHROUGH BLEEDING WITH IUD: Primary | ICD-10-CM

## 2025-08-28 DIAGNOSIS — N89.8 VAGINAL ODOR: ICD-10-CM

## 2025-08-28 DIAGNOSIS — R35.0 FREQUENT URINATION: ICD-10-CM

## 2025-08-28 LAB
BILIRUBIN, POC: NORMAL
BLOOD URINE, POC: NORMAL
CLARITY, POC: NORMAL
COLOR, POC: YELLOW
GLUCOSE URINE, POC: NORMAL MG/DL
KETONES, POC: NORMAL
LEUKOCYTE EST, POC: NORMAL
NITRITE, POC: NORMAL
PH, POC: NORMAL
PROTEIN, POC: NORMAL MG/DL
SPECIFIC GRAVITY, POC: NORMAL
UROBILINOGEN, POC: NORMAL

## 2025-08-28 RX ORDER — FLUCONAZOLE 150 MG/1
TABLET ORAL
Qty: 2 TABLET | Refills: 0 | Status: SHIPPED | OUTPATIENT
Start: 2025-08-28

## 2025-08-28 RX ORDER — BUPROPION HYDROCHLORIDE 150 MG/1
150 TABLET ORAL EVERY MORNING
COMMUNITY
Start: 2025-07-01

## 2025-08-28 RX ORDER — LEVOTHYROXINE SODIUM 50 UG/1
50 TABLET ORAL
COMMUNITY
Start: 2025-02-03 | End: 2026-02-03

## 2025-08-28 RX ORDER — METRONIDAZOLE 500 MG/1
500 TABLET ORAL 2 TIMES DAILY
Qty: 14 TABLET | Refills: 0 | Status: SHIPPED | OUTPATIENT
Start: 2025-08-28 | End: 2025-09-04

## 2025-08-28 SDOH — ECONOMIC STABILITY: FOOD INSECURITY: WITHIN THE PAST 12 MONTHS, THE FOOD YOU BOUGHT JUST DIDN'T LAST AND YOU DIDN'T HAVE MONEY TO GET MORE.: NEVER TRUE

## 2025-08-28 SDOH — ECONOMIC STABILITY: FOOD INSECURITY: WITHIN THE PAST 12 MONTHS, YOU WORRIED THAT YOUR FOOD WOULD RUN OUT BEFORE YOU GOT MONEY TO BUY MORE.: NEVER TRUE

## 2025-08-28 ASSESSMENT — PATIENT HEALTH QUESTIONNAIRE - PHQ9
SUM OF ALL RESPONSES TO PHQ QUESTIONS 1-9: 0
2. FEELING DOWN, DEPRESSED OR HOPELESS: NOT AT ALL
SUM OF ALL RESPONSES TO PHQ QUESTIONS 1-9: 0
SUM OF ALL RESPONSES TO PHQ QUESTIONS 1-9: 0
1. LITTLE INTEREST OR PLEASURE IN DOING THINGS: NOT AT ALL
SUM OF ALL RESPONSES TO PHQ QUESTIONS 1-9: 0

## 2025-08-29 LAB
A BAUMANNII DNA SPEC QL NAA+PROBE: NOT DETECTED
BV BACTERIA DNA VAG QL NAA+PROBE: NOT DETECTED
C GLABRATA DNA VAG QL NAA+PROBE: ABNORMAL
C GLABRATA DNA VAG QL NAA+PROBE: NOT DETECTED
C KRUSEI DNA VAG QL NAA+PROBE: NOT DETECTED
C TRACH DNA UR QL NAA+PROBE: NEGATIVE
CANDIDA DNA VAG QL NAA+PROBE: NOT DETECTED
CARBAPENEM RESISTANCE OXA-48 GENE BY PCR: NOT DETECTED
CEPHALOSPORIN RESISTANCE AMPC GENE: NOT DETECTED
ENTEROCOC DNA SPEC QL NAA+PROBE: NOT DETECTED
ESBL RESISTANCE: NOT DETECTED
GP B STREP DNA SPEC QL NAA+PROBE: NORMAL
K PNEUMON DNA SPEC QL NAA+PROBE: NOT DETECTED
MACROLIDE RESISTANCE: NORMAL
METHICILLIN RESISTANCE: NOT DETECTED
MRSA DNA SPEC QL NAA+PROBE: NOT DETECTED
N GONORRHOEA DNA UR QL NAA+PROBE: NEGATIVE
OTHER MICROORG DNA SPEC QL NAA+PROBE: NOT DETECTED
P AERUGINOSA DNA SPEC QL NAA+PROBE: NOT DETECTED
P MIRABILIS DNA SPEC QL NAA+PROBE: NOT DETECTED
QUINOLONE AND FLUOROQUINOLONE RESISTANCE: NOT DETECTED
S PYO RRNA SPEC QL PROBE: NOT DETECTED
T VAGINALIS DNA VAG QL NAA+PROBE: DETECTED
TETRACYCLINE RESISTANCE: NORMAL
TRIMETHOPRIM/SULFONAMIDE RESISTANCE: NOT DETECTED

## 2025-08-29 ASSESSMENT — ENCOUNTER SYMPTOMS
GASTROINTESTINAL NEGATIVE: 1
VOMITING: 0
CHEST TIGHTNESS: 0
DIARRHEA: 0
RESPIRATORY NEGATIVE: 1
NAUSEA: 0
SHORTNESS OF BREATH: 0
CONSTIPATION: 0
ABDOMINAL PAIN: 0